# Patient Record
Sex: FEMALE | Race: OTHER | HISPANIC OR LATINO
[De-identification: names, ages, dates, MRNs, and addresses within clinical notes are randomized per-mention and may not be internally consistent; named-entity substitution may affect disease eponyms.]

---

## 2019-08-18 PROBLEM — Z00.00 ENCOUNTER FOR PREVENTIVE HEALTH EXAMINATION: Status: ACTIVE | Noted: 2019-08-18

## 2019-09-17 ENCOUNTER — APPOINTMENT (OUTPATIENT)
Dept: OPHTHALMOLOGY | Facility: CLINIC | Age: 59
End: 2019-09-17
Payer: COMMERCIAL

## 2019-09-17 ENCOUNTER — NON-APPOINTMENT (OUTPATIENT)
Age: 59
End: 2019-09-17

## 2019-09-17 PROCEDURE — 92002 INTRM OPH EXAM NEW PATIENT: CPT

## 2019-10-01 ENCOUNTER — APPOINTMENT (OUTPATIENT)
Dept: OPHTHALMOLOGY | Facility: CLINIC | Age: 59
End: 2019-10-01
Payer: COMMERCIAL

## 2019-10-01 ENCOUNTER — NON-APPOINTMENT (OUTPATIENT)
Age: 59
End: 2019-10-01

## 2019-10-01 PROCEDURE — 92012 INTRM OPH EXAM EST PATIENT: CPT

## 2019-10-10 ENCOUNTER — NON-APPOINTMENT (OUTPATIENT)
Age: 59
End: 2019-10-10

## 2019-10-10 ENCOUNTER — APPOINTMENT (OUTPATIENT)
Dept: OPHTHALMOLOGY | Facility: CLINIC | Age: 59
End: 2019-10-10
Payer: COMMERCIAL

## 2019-10-10 PROCEDURE — 92012 INTRM OPH EXAM EST PATIENT: CPT

## 2019-10-31 ENCOUNTER — NON-APPOINTMENT (OUTPATIENT)
Age: 59
End: 2019-10-31

## 2019-10-31 ENCOUNTER — APPOINTMENT (OUTPATIENT)
Dept: OPHTHALMOLOGY | Facility: CLINIC | Age: 59
End: 2019-10-31
Payer: COMMERCIAL

## 2019-10-31 PROCEDURE — 92012 INTRM OPH EXAM EST PATIENT: CPT

## 2019-11-22 ENCOUNTER — APPOINTMENT (OUTPATIENT)
Dept: OPHTHALMOLOGY | Facility: CLINIC | Age: 59
End: 2019-11-22
Payer: COMMERCIAL

## 2019-11-22 ENCOUNTER — NON-APPOINTMENT (OUTPATIENT)
Age: 59
End: 2019-11-22

## 2019-11-22 PROCEDURE — 92012 INTRM OPH EXAM EST PATIENT: CPT

## 2020-01-03 ENCOUNTER — APPOINTMENT (OUTPATIENT)
Dept: OPHTHALMOLOGY | Facility: CLINIC | Age: 60
End: 2020-01-03
Payer: COMMERCIAL

## 2020-01-03 ENCOUNTER — NON-APPOINTMENT (OUTPATIENT)
Age: 60
End: 2020-01-03

## 2020-01-03 PROCEDURE — 92012 INTRM OPH EXAM EST PATIENT: CPT

## 2020-03-06 ENCOUNTER — NON-APPOINTMENT (OUTPATIENT)
Age: 60
End: 2020-03-06

## 2020-03-06 ENCOUNTER — APPOINTMENT (OUTPATIENT)
Dept: OPHTHALMOLOGY | Facility: CLINIC | Age: 60
End: 2020-03-06
Payer: COMMERCIAL

## 2020-03-06 PROCEDURE — 92012 INTRM OPH EXAM EST PATIENT: CPT

## 2020-07-19 ENCOUNTER — TRANSCRIPTION ENCOUNTER (OUTPATIENT)
Age: 60
End: 2020-07-19

## 2020-07-20 ENCOUNTER — RESULT REVIEW (OUTPATIENT)
Age: 60
End: 2020-07-20

## 2020-07-20 ENCOUNTER — OUTPATIENT (OUTPATIENT)
Dept: OUTPATIENT SERVICES | Facility: HOSPITAL | Age: 60
LOS: 1 days | Discharge: HOME | End: 2020-07-20
Payer: COMMERCIAL

## 2020-07-20 ENCOUNTER — APPOINTMENT (OUTPATIENT)
Dept: OPHTHALMOLOGY | Facility: AMBULATORY SURGERY CENTER | Age: 60
End: 2020-07-20

## 2020-07-20 ENCOUNTER — NON-APPOINTMENT (OUTPATIENT)
Age: 60
End: 2020-07-20

## 2020-07-20 PROCEDURE — 65756 CORNEAL TRNSPL ENDOTHELIAL: CPT | Mod: 79,RT

## 2020-07-20 PROCEDURE — 88304 TISSUE EXAM BY PATHOLOGIST: CPT | Mod: 26

## 2020-07-21 ENCOUNTER — APPOINTMENT (OUTPATIENT)
Dept: OPHTHALMOLOGY | Facility: CLINIC | Age: 60
End: 2020-07-21
Payer: COMMERCIAL

## 2020-07-21 ENCOUNTER — NON-APPOINTMENT (OUTPATIENT)
Age: 60
End: 2020-07-21

## 2020-07-21 LAB
GRAM STN FLD: SIGNIFICANT CHANGE UP
SPECIMEN SOURCE: SIGNIFICANT CHANGE UP

## 2020-07-21 PROCEDURE — 99024 POSTOP FOLLOW-UP VISIT: CPT

## 2020-07-22 LAB — SURGICAL PATHOLOGY STUDY: SIGNIFICANT CHANGE UP

## 2020-07-24 LAB
CULTURE RESULTS: NO GROWTH — SIGNIFICANT CHANGE UP
SPECIMEN SOURCE: SIGNIFICANT CHANGE UP

## 2020-07-28 ENCOUNTER — NON-APPOINTMENT (OUTPATIENT)
Age: 60
End: 2020-07-28

## 2020-07-28 ENCOUNTER — APPOINTMENT (OUTPATIENT)
Dept: OPHTHALMOLOGY | Facility: CLINIC | Age: 60
End: 2020-07-28
Payer: COMMERCIAL

## 2020-07-28 PROCEDURE — 99024 POSTOP FOLLOW-UP VISIT: CPT

## 2020-08-26 ENCOUNTER — NON-APPOINTMENT (OUTPATIENT)
Age: 60
End: 2020-08-26

## 2020-08-26 ENCOUNTER — APPOINTMENT (OUTPATIENT)
Dept: OPHTHALMOLOGY | Facility: CLINIC | Age: 60
End: 2020-08-26
Payer: COMMERCIAL

## 2020-08-26 PROCEDURE — 99024 POSTOP FOLLOW-UP VISIT: CPT

## 2020-09-30 ENCOUNTER — NON-APPOINTMENT (OUTPATIENT)
Age: 60
End: 2020-09-30

## 2020-09-30 ENCOUNTER — APPOINTMENT (OUTPATIENT)
Dept: OPHTHALMOLOGY | Facility: CLINIC | Age: 60
End: 2020-09-30
Payer: COMMERCIAL

## 2020-09-30 PROCEDURE — 99024 POSTOP FOLLOW-UP VISIT: CPT

## 2020-11-04 ENCOUNTER — APPOINTMENT (OUTPATIENT)
Dept: OPHTHALMOLOGY | Facility: CLINIC | Age: 60
End: 2020-11-04
Payer: COMMERCIAL

## 2020-11-04 ENCOUNTER — NON-APPOINTMENT (OUTPATIENT)
Age: 60
End: 2020-11-04

## 2020-11-04 PROCEDURE — 99072 ADDL SUPL MATRL&STAF TM PHE: CPT

## 2020-11-04 PROCEDURE — 99212 OFFICE O/P EST SF 10 MIN: CPT

## 2020-12-16 ENCOUNTER — NON-APPOINTMENT (OUTPATIENT)
Age: 60
End: 2020-12-16

## 2020-12-16 ENCOUNTER — APPOINTMENT (OUTPATIENT)
Dept: OPHTHALMOLOGY | Facility: CLINIC | Age: 60
End: 2020-12-16
Payer: COMMERCIAL

## 2020-12-16 PROCEDURE — 99072 ADDL SUPL MATRL&STAF TM PHE: CPT

## 2020-12-16 PROCEDURE — 99212 OFFICE O/P EST SF 10 MIN: CPT

## 2021-01-27 ENCOUNTER — NON-APPOINTMENT (OUTPATIENT)
Age: 61
End: 2021-01-27

## 2021-01-27 ENCOUNTER — APPOINTMENT (OUTPATIENT)
Dept: OPHTHALMOLOGY | Facility: CLINIC | Age: 61
End: 2021-01-27
Payer: COMMERCIAL

## 2021-01-27 PROCEDURE — 99024 POSTOP FOLLOW-UP VISIT: CPT

## 2021-03-24 ENCOUNTER — APPOINTMENT (OUTPATIENT)
Dept: OPHTHALMOLOGY | Facility: CLINIC | Age: 61
End: 2021-03-24

## 2021-08-03 ENCOUNTER — APPOINTMENT (OUTPATIENT)
Dept: OPHTHALMOLOGY | Facility: CLINIC | Age: 61
End: 2021-08-03
Payer: COMMERCIAL

## 2021-08-03 ENCOUNTER — NON-APPOINTMENT (OUTPATIENT)
Age: 61
End: 2021-08-03

## 2021-08-03 PROCEDURE — 92012 INTRM OPH EXAM EST PATIENT: CPT

## 2021-11-03 ENCOUNTER — APPOINTMENT (OUTPATIENT)
Dept: OPHTHALMOLOGY | Facility: CLINIC | Age: 61
End: 2021-11-03
Payer: COMMERCIAL

## 2021-11-03 ENCOUNTER — NON-APPOINTMENT (OUTPATIENT)
Age: 61
End: 2021-11-03

## 2021-11-03 PROCEDURE — 92012 INTRM OPH EXAM EST PATIENT: CPT

## 2022-01-26 ENCOUNTER — NON-APPOINTMENT (OUTPATIENT)
Age: 62
End: 2022-01-26

## 2022-01-26 ENCOUNTER — APPOINTMENT (OUTPATIENT)
Dept: OPHTHALMOLOGY | Facility: CLINIC | Age: 62
End: 2022-01-26
Payer: COMMERCIAL

## 2022-01-26 PROCEDURE — 92012 INTRM OPH EXAM EST PATIENT: CPT

## 2022-05-06 RX ORDER — OFLOXACIN 0.3 %
1 DROPS OPHTHALMIC (EYE)
Refills: 0 | Status: COMPLETED | OUTPATIENT
Start: 2022-05-09 | End: 2022-05-09

## 2022-05-06 NOTE — ASU PATIENT PROFILE, ADULT - NSICDXPASTSURGICALHX_GEN_ALL_CORE_FT
PAST SURGICAL HISTORY:  H/O cataract extraction right eye    H/O  section     Transplanted cornea bilateral, multiple

## 2022-05-06 NOTE — ASU PATIENT PROFILE, ADULT - FALL HARM RISK - UNIVERSAL INTERVENTIONS
Bed in lowest position, wheels locked, appropriate side rails in place/Call bell, personal items and telephone in reach/Instruct patient to call for assistance before getting out of bed or chair/Non-slip footwear when patient is out of bed/[place] to call system/Physically safe environment - no spills, clutter or unnecessary equipment/Purposeful Proactive Rounding/Room/bathroom lighting operational, light cord in reach

## 2022-05-09 ENCOUNTER — TRANSCRIPTION ENCOUNTER (OUTPATIENT)
Age: 62
End: 2022-05-09

## 2022-05-09 ENCOUNTER — NON-APPOINTMENT (OUTPATIENT)
Age: 62
End: 2022-05-09

## 2022-05-09 ENCOUNTER — RESULT REVIEW (OUTPATIENT)
Age: 62
End: 2022-05-09

## 2022-05-09 ENCOUNTER — APPOINTMENT (OUTPATIENT)
Dept: OPHTHALMOLOGY | Facility: AMBULATORY SURGERY CENTER | Age: 62
End: 2022-05-09

## 2022-05-09 ENCOUNTER — OUTPATIENT (OUTPATIENT)
Dept: OUTPATIENT SERVICES | Facility: HOSPITAL | Age: 62
LOS: 1 days | Discharge: HOME | End: 2022-05-09
Payer: COMMERCIAL

## 2022-05-09 VITALS
TEMPERATURE: 98 F | SYSTOLIC BLOOD PRESSURE: 153 MMHG | HEART RATE: 68 BPM | HEIGHT: 62 IN | OXYGEN SATURATION: 100 % | RESPIRATION RATE: 16 BRPM | WEIGHT: 166.45 LBS | DIASTOLIC BLOOD PRESSURE: 70 MMHG

## 2022-05-09 VITALS
TEMPERATURE: 98 F | SYSTOLIC BLOOD PRESSURE: 137 MMHG | OXYGEN SATURATION: 100 % | HEART RATE: 62 BPM | RESPIRATION RATE: 16 BRPM | DIASTOLIC BLOOD PRESSURE: 72 MMHG

## 2022-05-09 DIAGNOSIS — Z98.49 CATARACT EXTRACTION STATUS, UNSPECIFIED EYE: Chronic | ICD-10-CM

## 2022-05-09 DIAGNOSIS — Z98.891 HISTORY OF UTERINE SCAR FROM PREVIOUS SURGERY: Chronic | ICD-10-CM

## 2022-05-09 DIAGNOSIS — Z94.7 CORNEAL TRANSPLANT STATUS: Chronic | ICD-10-CM

## 2022-05-09 LAB
GRAM STN FLD: SIGNIFICANT CHANGE UP
SPECIMEN SOURCE: SIGNIFICANT CHANGE UP

## 2022-05-09 PROCEDURE — 65756 CORNEAL TRNSPL ENDOTHELIAL: CPT | Mod: RT

## 2022-05-09 PROCEDURE — 88304 TISSUE EXAM BY PATHOLOGIST: CPT | Mod: 26

## 2022-05-09 RX ORDER — ACETAMINOPHEN 500 MG
650 TABLET ORAL ONCE
Refills: 0 | Status: COMPLETED | OUTPATIENT
Start: 2022-05-09 | End: 2022-05-09

## 2022-05-09 RX ADMIN — Medication 1 DROP(S): at 14:25

## 2022-05-09 RX ADMIN — Medication 1 DROP(S): at 14:20

## 2022-05-09 RX ADMIN — Medication 1 DROP(S): at 14:15

## 2022-05-09 RX ADMIN — Medication 650 MILLIGRAM(S): at 14:26

## 2022-05-09 NOTE — OPERATIVE REPORT - OPERATIVE RPOSRT DETAILS
PREOPERATIVE DIAGNOSIS: Failed DSAEK, Right Eye    POSTOPERATIVE DIAGNOSIS: Failed DSAEK, Right Eye    OPERATIVE PROCEDURE: Repeat Descemet Stripping Automated Endothelial Keratoplasty, Right Eye    IMPLANT: Donor # [],  Graft size []      PROCEDURE:  The patient was brought into the operating room and placed supine on the operating room table. After uneventful induction of neuroleptic anesthesia, a retrobulbar block consisting of 5-7 cc of  2% lidocaine and 0.75% marcaine was administered around the left eye. A modified van Lint style lid block was also given. The patient was then prepped and draped in the usual sterile fashion. A wire lid speculum was inserted into the operative eye giving a wide palpebral fissure. This procedure is going to be performed with an assistant surgeon whose extra hands are required to properly manage the surgery.    Using Castroviejo calipers it was determined that the chosen donor size would adequately cover the posterior corneal surface. A claus knife was used to perform appropriate paracenteses through clear cornea at the temporal and nasal oblique meridians. An anterior chamber maintainer was passed through the inferonasal paracentesis and balanced salt solution was infused to maintain the anterior chamber. Attention was then addressed to the precut donor tissue. The donor was cut to the appropriate size using a punch block and trephine, and the lenticule was covered with storage medium and set aside for later use.    Attention was then readdressed to the recipient cornea. A reverse Sinsky hook was used to score Descemet's membrane at a diameter just smaller than the intended donor lenticule. A keratome was used to create a clear corneal incision just inside the temporal limbus. A Descemet's stripping instrument was used to remove Descemet's membrane from the scored area. The keratome was then used to widen the temporal corneal wound to approximately 4.5 mm. The donor lenticule was then brought into the surgical field and loaded into a miniBusin glide  which was placed into the corneal wound. Tan endoforceps were then passed through a paracentesis across the anterior chamber to the temporal corneal wound grasping the donor lenticule and advancing it into the anterior chamber. The donor lenticule spontaneously unfurled endothelial side down.    The anterior chamber maintainer was removed and the paracentesis and corneal wound were closed with 10-0 nylon sutures. All the knots were then buried.  A 30 gauge cannula was placed on a 5 cc syringe filled with either air or 20% SF6 gas and then passed through a paracentesis under the donor lenticule filling the anterior chamber with air or gas to tamponade the lenticule against the inner surface of the cornea. Topical irrigation of 4 mg dexamethasone and 100 mg cephazolin was administered inferiorly in the conjunctival cul de sac. The lid speculum was removed from the eye and a shield and dressing placed over the eye.  The patient tolerated the procedure well and was to remain in a supine position in the recovery area for approximately 45 minutes before discharge     PREOPERATIVE DIAGNOSIS: Failed DSAEK, Right Eye    POSTOPERATIVE DIAGNOSIS: Failed DSAEK, Right Eye    OPERATIVE PROCEDURE: Repeat Descemet Stripping Automated Endothelial Keratoplasty, Right Eye    IMPLANT: Donor # [id number],  Graft size []      PROCEDURE:  The patient was brought into the operating room and placed supine on the operating room table. After uneventful induction of neuroleptic anesthesia, a retrobulbar block consisting of 5-7 cc of  2% lidocaine and 0.75% marcaine was administered around the left eye. A modified van Lint style lid block was also given. The patient was then prepped and draped in the usual sterile fashion. A wire lid speculum was inserted into the operative eye giving a wide palpebral fissure. This procedure is going to be performed with an assistant surgeon whose extra hands are required to properly manage the surgery.    Using Castroviejo calipers it was determined that the chosen donor size would adequately cover the posterior corneal surface. A claus knife was used to perform appropriate paracenteses through clear cornea at the temporal and nasal oblique meridians. An anterior chamber maintainer was passed through the inferonasal paracentesis and balanced salt solution was infused to maintain the anterior chamber. Attention was then addressed to the precut donor tissue. The donor was cut to the appropriate size using a punch block and trephine, and the lenticule was covered with storage medium and set aside for later use.    Attention was then readdressed to the recipient cornea. A reverse Sinsky hook was used to score Descemet's membrane at a diameter just smaller than the intended donor lenticule. A keratome was used to create a clear corneal incision just inside the temporal limbus. A Descemet's stripping instrument was used to remove Descemet's membrane from the scored area. The keratome was then used to widen the temporal corneal wound to approximately 4.5 mm. The donor lenticule was then brought into the surgical field and loaded into a miniBusin glide  which was placed into the corneal wound. Tan endoforceps were then passed through a paracentesis across the anterior chamber to the temporal corneal wound grasping the donor lenticule and advancing it into the anterior chamber. The donor lenticule spontaneously unfurled endothelial side down.    The anterior chamber maintainer was removed and the paracentesis and corneal wound were closed with 10-0 nylon sutures. All the knots were then buried.  A 30 gauge cannula was placed on a 5 cc syringe filled with either air or 20% SF6 gas and then passed through a paracentesis under the donor lenticule filling the anterior chamber with air or gas to tamponade the lenticule against the inner surface of the cornea. Topical irrigation of 4 mg dexamethasone and 100 mg cephazolin was administered inferiorly in the conjunctival cul de sac. The lid speculum was removed from the eye and a shield and dressing placed over the eye.  The patient tolerated the procedure well and was to remain in a supine position in the recovery area for approximately 45 minutes before discharge     PREOPERATIVE DIAGNOSIS: Failed DSAEK, Right Eye    POSTOPERATIVE DIAGNOSIS: Failed DSAEK, Right Eye    OPERATIVE PROCEDURE: Repeat Descemet Stripping Automated Endothelial Keratoplasty and Pupilloplasty, Right Eye    IMPLANT: Donor # [id number],  Graft size 8.0mm      PROCEDURE:  The patient was brought into the operating room and placed supine on the operating room table. After uneventful induction of neuroleptic anesthesia, a retrobulbar block consisting of 5-7 cc of  2% lidocaine and 0.75% marcaine was administered around the left eye. A modified van Lint style lid block was also given. The patient was then prepped and draped in the usual sterile fashion. A wire lid speculum was inserted into the operative eye giving a wide palpebral fissure. This procedure is going to be performed with an assistant surgeon whose extra hands are required to properly manage the surgery.    Using Castroviejo calipers it was determined that the chosen donor size would adequately cover the posterior corneal surface. A claus knife was used to perform appropriate paracenteses through clear cornea at the temporal and nasal oblique meridians. A cyclodialysis spatula was used to perform synechialysis of the iris adhesions to the inferior cornea. An anterior chamber maintainer was passed through the inferonasal paracentesis and balanced salt solution was infused to maintain the anterior chamber. A reverse sinsky hook was used to detach the failed DSAEK lenticule. Attention was then addressed to the precut donor tissue. The donor was cut to the appropriate size using a punch block and trephine, and the lenticule was covered with storage medium and set aside for later use.    Attention was then readdressed to the recipient cornea. A keratome was used to create a clear corneal incision just inside the temporal limbus. A reverse sinsky was used to remove the failed DSAEK lenticule through the wound.  Given the laxity of the pupil by the temporal wound the decision was made to perform a pupilloplasty. A  10-0 prolene suture on a CTC 6L needle was placed through the 10 o clock paracentesis into the iris at the superotemporal edege and exited through the iris on the inferotermporal side. The needle was the placed through the peripheral cornea on the other side of the haptic and externalized.  The two free ends of the prolene suture were captured through the previously placed wound, knotted, and placed back into the eye thus completing the pupilloplasty. The keratome was then used to widen the temporal corneal wound to approximately 4.5 mm. The donor lenticule was then brought into the surgical field and loaded into a miniBusin glide  which was placed into the corneal wound. Tan endoforceps were then passed through a paracentesis across the anterior chamber to the temporal corneal wound grasping the donor lenticule and advancing it into the anterior chamber. The donor lenticule spontaneously unfurled endothelial side down.    The anterior chamber maintainer was removed and the paracentesis and corneal wound were closed with 10-0 nylon sutures. All the knots were then buried.  A 30 gauge cannula was placed on a 5 cc syringe filled with air and then passed through a paracentesis under the donor lenticule filling the anterior chamber with air or gas to tamponade the lenticule against the inner surface of the cornea. Topical irrigation of 4 mg dexamethasone and 100 mg cephazolin was administered inferiorly in the conjunctival cul de sac. The lid speculum was removed from the eye and a shield and dressing placed over the eye.  The patient tolerated the procedure well and was to remain in a supine position in the recovery area for approximately 45 minutes before discharge.

## 2022-05-09 NOTE — ASU DISCHARGE PLAN (ADULT/PEDIATRIC) - NS MD DC FALL RISK RISK
For information on Fall & Injury Prevention, visit: [email or web address] OR  [email or web address] OR  https://www.cdc.gov/steadi/patient.html

## 2022-05-10 ENCOUNTER — APPOINTMENT (OUTPATIENT)
Dept: OPHTHALMOLOGY | Facility: CLINIC | Age: 62
End: 2022-05-10
Payer: COMMERCIAL

## 2022-05-10 ENCOUNTER — NON-APPOINTMENT (OUTPATIENT)
Age: 62
End: 2022-05-10

## 2022-05-10 PROCEDURE — 99024 POSTOP FOLLOW-UP VISIT: CPT

## 2022-05-17 ENCOUNTER — NON-APPOINTMENT (OUTPATIENT)
Age: 62
End: 2022-05-17

## 2022-05-17 ENCOUNTER — APPOINTMENT (OUTPATIENT)
Dept: OPHTHALMOLOGY | Facility: CLINIC | Age: 62
End: 2022-05-17
Payer: COMMERCIAL

## 2022-05-17 PROBLEM — I10 ESSENTIAL (PRIMARY) HYPERTENSION: Chronic | Status: ACTIVE | Noted: 2022-05-07

## 2022-05-17 PROBLEM — E78.00 PURE HYPERCHOLESTEROLEMIA, UNSPECIFIED: Chronic | Status: ACTIVE | Noted: 2022-05-07

## 2022-05-17 PROCEDURE — 99024 POSTOP FOLLOW-UP VISIT: CPT

## 2022-05-31 LAB
CULTURE RESULTS: NO GROWTH — SIGNIFICANT CHANGE UP
SPECIMEN SOURCE: SIGNIFICANT CHANGE UP

## 2022-06-07 ENCOUNTER — NON-APPOINTMENT (OUTPATIENT)
Age: 62
End: 2022-06-07

## 2022-06-07 ENCOUNTER — APPOINTMENT (OUTPATIENT)
Dept: OPHTHALMOLOGY | Facility: CLINIC | Age: 62
End: 2022-06-07
Payer: COMMERCIAL

## 2022-06-07 PROCEDURE — 99024 POSTOP FOLLOW-UP VISIT: CPT

## 2022-06-21 ENCOUNTER — APPOINTMENT (OUTPATIENT)
Dept: OPHTHALMOLOGY | Facility: CLINIC | Age: 62
End: 2022-06-21
Payer: COMMERCIAL

## 2022-06-21 ENCOUNTER — NON-APPOINTMENT (OUTPATIENT)
Age: 62
End: 2022-06-21

## 2022-06-21 PROCEDURE — 99024 POSTOP FOLLOW-UP VISIT: CPT

## 2022-08-17 ENCOUNTER — NON-APPOINTMENT (OUTPATIENT)
Age: 62
End: 2022-08-17

## 2022-08-17 ENCOUNTER — APPOINTMENT (OUTPATIENT)
Dept: OPHTHALMOLOGY | Facility: CLINIC | Age: 62
End: 2022-08-17

## 2022-08-17 PROCEDURE — 99212 OFFICE O/P EST SF 10 MIN: CPT

## 2022-11-21 NOTE — ASU PATIENT PROFILE, ADULT - WILL THE PATIENT ACCEPT THE PFIZER COVID-19 VACCINE IF ELIGIBLE AND IT IS AVAILABLE?
Not applicable Opioid Counseling: I discussed with the patient the potential side effects of opioids including but not limited to addiction, altered mental status, and depression. I stressed avoiding alcohol, benzodiazepines, muscle relaxants and sleep aids unless specifically okayed by a physician. The patient verbalized understanding of the proper use and possible adverse effects of opioids. All of the patient's questions and concerns were addressed. They were instructed to flush the remaining pills down the toilet if they did not need them for pain.

## 2023-01-13 ENCOUNTER — NON-APPOINTMENT (OUTPATIENT)
Age: 63
End: 2023-01-13

## 2023-01-13 ENCOUNTER — APPOINTMENT (OUTPATIENT)
Dept: OPHTHALMOLOGY | Facility: CLINIC | Age: 63
End: 2023-01-13
Payer: COMMERCIAL

## 2023-01-13 PROCEDURE — 92012 INTRM OPH EXAM EST PATIENT: CPT

## 2023-05-19 ENCOUNTER — NON-APPOINTMENT (OUTPATIENT)
Age: 63
End: 2023-05-19

## 2023-05-19 ENCOUNTER — APPOINTMENT (OUTPATIENT)
Dept: OPHTHALMOLOGY | Facility: CLINIC | Age: 63
End: 2023-05-19
Payer: COMMERCIAL

## 2023-05-19 PROCEDURE — 92012 INTRM OPH EXAM EST PATIENT: CPT

## 2023-08-18 ENCOUNTER — NON-APPOINTMENT (OUTPATIENT)
Age: 63
End: 2023-08-18

## 2023-08-18 ENCOUNTER — APPOINTMENT (OUTPATIENT)
Dept: OPHTHALMOLOGY | Facility: CLINIC | Age: 63
End: 2023-08-18
Payer: COMMERCIAL

## 2023-08-18 PROCEDURE — 92012 INTRM OPH EXAM EST PATIENT: CPT

## 2023-09-26 ENCOUNTER — NON-APPOINTMENT (OUTPATIENT)
Age: 63
End: 2023-09-26

## 2023-09-26 ENCOUNTER — APPOINTMENT (OUTPATIENT)
Dept: OPHTHALMOLOGY | Facility: CLINIC | Age: 63
End: 2023-09-26
Payer: COMMERCIAL

## 2023-09-26 PROCEDURE — 92014 COMPRE OPH EXAM EST PT 1/>: CPT

## 2023-09-26 PROCEDURE — 76514 ECHO EXAM OF EYE THICKNESS: CPT

## 2023-09-26 PROCEDURE — 92133 CPTRZD OPH DX IMG PST SGM ON: CPT

## 2023-10-27 ENCOUNTER — APPOINTMENT (OUTPATIENT)
Dept: OPHTHALMOLOGY | Facility: CLINIC | Age: 63
End: 2023-10-27

## 2023-11-10 ENCOUNTER — NON-APPOINTMENT (OUTPATIENT)
Age: 63
End: 2023-11-10

## 2023-11-10 ENCOUNTER — APPOINTMENT (OUTPATIENT)
Dept: OPHTHALMOLOGY | Facility: CLINIC | Age: 63
End: 2023-11-10
Payer: COMMERCIAL

## 2023-11-10 PROCEDURE — 92012 INTRM OPH EXAM EST PATIENT: CPT

## 2024-02-09 ENCOUNTER — APPOINTMENT (OUTPATIENT)
Dept: OPHTHALMOLOGY | Facility: CLINIC | Age: 64
End: 2024-02-09
Payer: COMMERCIAL

## 2024-02-09 ENCOUNTER — NON-APPOINTMENT (OUTPATIENT)
Age: 64
End: 2024-02-09

## 2024-02-09 PROCEDURE — 92012 INTRM OPH EXAM EST PATIENT: CPT

## 2024-05-30 ENCOUNTER — NON-APPOINTMENT (OUTPATIENT)
Age: 64
End: 2024-05-30

## 2024-05-30 ENCOUNTER — APPOINTMENT (OUTPATIENT)
Dept: OPHTHALMOLOGY | Facility: CLINIC | Age: 64
End: 2024-05-30
Payer: COMMERCIAL

## 2024-05-30 PROCEDURE — 92002 INTRM OPH EXAM NEW PATIENT: CPT

## 2024-06-12 ENCOUNTER — NON-APPOINTMENT (OUTPATIENT)
Age: 64
End: 2024-06-12

## 2024-06-12 ENCOUNTER — APPOINTMENT (OUTPATIENT)
Dept: OPHTHALMOLOGY | Facility: CLINIC | Age: 64
End: 2024-06-12
Payer: COMMERCIAL

## 2024-06-12 PROCEDURE — 92012 INTRM OPH EXAM EST PATIENT: CPT

## 2024-07-02 ENCOUNTER — APPOINTMENT (OUTPATIENT)
Dept: OPHTHALMOLOGY | Facility: AMBULATORY SURGERY CENTER | Age: 64
End: 2024-07-02

## 2024-07-02 ENCOUNTER — OUTPATIENT (OUTPATIENT)
Dept: OUTPATIENT SERVICES | Facility: HOSPITAL | Age: 64
LOS: 1 days | Discharge: HOME | End: 2024-07-02
Payer: COMMERCIAL

## 2024-07-02 ENCOUNTER — RESULT REVIEW (OUTPATIENT)
Age: 64
End: 2024-07-02

## 2024-07-02 VITALS
HEART RATE: 62 BPM | TEMPERATURE: 97 F | DIASTOLIC BLOOD PRESSURE: 58 MMHG | OXYGEN SATURATION: 100 % | SYSTOLIC BLOOD PRESSURE: 126 MMHG | RESPIRATION RATE: 16 BRPM

## 2024-07-02 VITALS
HEART RATE: 69 BPM | RESPIRATION RATE: 16 BRPM | HEIGHT: 62 IN | TEMPERATURE: 97 F | OXYGEN SATURATION: 100 % | DIASTOLIC BLOOD PRESSURE: 62 MMHG | SYSTOLIC BLOOD PRESSURE: 132 MMHG | WEIGHT: 171.74 LBS

## 2024-07-02 DIAGNOSIS — Z98.891 HISTORY OF UTERINE SCAR FROM PREVIOUS SURGERY: Chronic | ICD-10-CM

## 2024-07-02 DIAGNOSIS — Z98.49 CATARACT EXTRACTION STATUS, UNSPECIFIED EYE: Chronic | ICD-10-CM

## 2024-07-02 DIAGNOSIS — Z98.51 TUBAL LIGATION STATUS: Chronic | ICD-10-CM

## 2024-07-02 DIAGNOSIS — Z94.7 CORNEAL TRANSPLANT STATUS: Chronic | ICD-10-CM

## 2024-07-02 PROCEDURE — 65756 CORNEAL TRNSPL ENDOTHELIAL: CPT | Mod: RT

## 2024-07-02 PROCEDURE — 88304 TISSUE EXAM BY PATHOLOGIST: CPT | Mod: 26

## 2024-07-02 DEVICE — GAS IOL HEXAFLUORIDE CYL
Type: IMPLANTABLE DEVICE | Site: RIGHT | Status: NON-FUNCTIONAL
Removed: 2024-07-02

## 2024-07-02 RX ORDER — OMEPRAZOLE 10 MG/1
1 CAPSULE, DELAYED RELEASE ORAL
Refills: 0 | DISCHARGE

## 2024-07-02 RX ORDER — HYDROCHLOROTHIAZIDE 25 MG
1 TABLET ORAL
Refills: 0 | DISCHARGE

## 2024-07-02 RX ORDER — OFLOXACIN 3 MG/ML
1 SOLUTION/ DROPS OPHTHALMIC
Refills: 0 | Status: COMPLETED | OUTPATIENT
Start: 2024-07-02 | End: 2024-07-02

## 2024-07-02 RX ORDER — LOSARTAN POTASSIUM 100 MG/1
1 TABLET, FILM COATED ORAL
Qty: 0 | Refills: 0 | DISCHARGE

## 2024-07-02 RX ORDER — OXYCODONE HYDROCHLORIDE 100 MG/5ML
5 SOLUTION ORAL ONCE
Refills: 0 | Status: DISCONTINUED | OUTPATIENT
Start: 2024-07-02 | End: 2024-07-02

## 2024-07-02 RX ORDER — BRIMONIDINE TARTRATE, TIMOLOL MALEATE 2; 5 MG/ML; MG/ML
1 SOLUTION/ DROPS OPHTHALMIC
Qty: 0 | Refills: 0 | DISCHARGE

## 2024-07-02 RX ORDER — MONTELUKAST SODIUM 10 MG/1
1 TABLET, FILM COATED ORAL
Refills: 0 | DISCHARGE

## 2024-07-02 RX ORDER — METOPROLOL TARTRATE 50 MG
1 TABLET ORAL
Refills: 0 | DISCHARGE

## 2024-07-02 RX ORDER — DUREZOL 0.5 MG/ML
0 EMULSION OPHTHALMIC
Qty: 0 | Refills: 0 | DISCHARGE

## 2024-07-02 RX ORDER — DEXTROSE MONOHYDRATE AND SODIUM CHLORIDE 5; .3 G/100ML; G/100ML
1000 INJECTION, SOLUTION INTRAVENOUS
Refills: 0 | Status: DISCONTINUED | OUTPATIENT
Start: 2024-07-02 | End: 2024-07-02

## 2024-07-02 RX ORDER — ATORVASTATIN CALCIUM 80 MG/1
1 TABLET, FILM COATED ORAL
Qty: 0 | Refills: 0 | DISCHARGE

## 2024-07-02 RX ORDER — ONDANSETRON HYDROCHLORIDE 2 MG/ML
4 INJECTION INTRAMUSCULAR; INTRAVENOUS ONCE
Refills: 0 | Status: DISCONTINUED | OUTPATIENT
Start: 2024-07-02 | End: 2024-07-02

## 2024-07-02 RX ORDER — TETRACAINE HCL 0.5 %
1 DROPS OPHTHALMIC (EYE) ONCE
Refills: 0 | Status: COMPLETED | OUTPATIENT
Start: 2024-07-02 | End: 2024-07-02

## 2024-07-02 RX ORDER — ACETAMINOPHEN 325 MG
650 TABLET ORAL ONCE
Refills: 0 | Status: DISCONTINUED | OUTPATIENT
Start: 2024-07-02 | End: 2024-07-02

## 2024-07-02 RX ORDER — BUDESONIDE/FORMOTEROL FUMARATE 160-4.5MCG
2 HFA AEROSOL WITH ADAPTER (GRAM) INHALATION
Refills: 0 | DISCHARGE

## 2024-07-02 RX ADMIN — OFLOXACIN 1 DROP(S): 3 SOLUTION/ DROPS OPHTHALMIC at 13:55

## 2024-07-02 RX ADMIN — OFLOXACIN 1 DROP(S): 3 SOLUTION/ DROPS OPHTHALMIC at 13:45

## 2024-07-02 RX ADMIN — Medication 1 DROP(S): at 13:45

## 2024-07-02 RX ADMIN — OFLOXACIN 1 DROP(S): 3 SOLUTION/ DROPS OPHTHALMIC at 13:50

## 2024-07-03 ENCOUNTER — APPOINTMENT (OUTPATIENT)
Dept: OPHTHALMOLOGY | Facility: CLINIC | Age: 64
End: 2024-07-03
Payer: COMMERCIAL

## 2024-07-03 ENCOUNTER — NON-APPOINTMENT (OUTPATIENT)
Age: 64
End: 2024-07-03

## 2024-07-03 PROCEDURE — 99024 POSTOP FOLLOW-UP VISIT: CPT

## 2024-07-08 LAB — SURGICAL PATHOLOGY STUDY: SIGNIFICANT CHANGE UP

## 2024-07-10 ENCOUNTER — NON-APPOINTMENT (OUTPATIENT)
Age: 64
End: 2024-07-10

## 2024-07-10 ENCOUNTER — APPOINTMENT (OUTPATIENT)
Dept: OPHTHALMOLOGY | Facility: CLINIC | Age: 64
End: 2024-07-10
Payer: COMMERCIAL

## 2024-07-10 PROCEDURE — 92134 CPTRZ OPH DX IMG PST SGM RTA: CPT

## 2024-07-10 PROCEDURE — 99024 POSTOP FOLLOW-UP VISIT: CPT

## 2024-07-16 LAB
CULTURE RESULTS: SIGNIFICANT CHANGE UP
SPECIMEN SOURCE: SIGNIFICANT CHANGE UP

## 2024-07-17 ENCOUNTER — NON-APPOINTMENT (OUTPATIENT)
Age: 64
End: 2024-07-17

## 2024-07-17 ENCOUNTER — APPOINTMENT (OUTPATIENT)
Dept: OPHTHALMOLOGY | Facility: CLINIC | Age: 64
End: 2024-07-17
Payer: COMMERCIAL

## 2024-07-17 PROCEDURE — 99024 POSTOP FOLLOW-UP VISIT: CPT

## 2024-07-31 ENCOUNTER — APPOINTMENT (OUTPATIENT)
Dept: OPHTHALMOLOGY | Facility: CLINIC | Age: 64
End: 2024-07-31
Payer: COMMERCIAL

## 2024-07-31 ENCOUNTER — NON-APPOINTMENT (OUTPATIENT)
Age: 64
End: 2024-07-31

## 2024-07-31 PROCEDURE — 99024 POSTOP FOLLOW-UP VISIT: CPT

## 2024-08-27 ENCOUNTER — NON-APPOINTMENT (OUTPATIENT)
Age: 64
End: 2024-08-27

## 2024-08-27 ENCOUNTER — APPOINTMENT (OUTPATIENT)
Dept: OPHTHALMOLOGY | Facility: CLINIC | Age: 64
End: 2024-08-27
Payer: COMMERCIAL

## 2024-08-27 PROCEDURE — 99024 POSTOP FOLLOW-UP VISIT: CPT

## 2024-09-24 ENCOUNTER — APPOINTMENT (OUTPATIENT)
Dept: OPHTHALMOLOGY | Facility: CLINIC | Age: 64
End: 2024-09-24
Payer: COMMERCIAL

## 2024-09-24 ENCOUNTER — NON-APPOINTMENT (OUTPATIENT)
Age: 64
End: 2024-09-24

## 2024-09-24 PROCEDURE — 99024 POSTOP FOLLOW-UP VISIT: CPT

## 2024-11-05 ENCOUNTER — NON-APPOINTMENT (OUTPATIENT)
Age: 64
End: 2024-11-05

## 2024-11-05 ENCOUNTER — APPOINTMENT (OUTPATIENT)
Dept: OPHTHALMOLOGY | Facility: CLINIC | Age: 64
End: 2024-11-05
Payer: COMMERCIAL

## 2024-11-05 PROCEDURE — 92012 INTRM OPH EXAM EST PATIENT: CPT

## 2024-11-20 ENCOUNTER — NON-APPOINTMENT (OUTPATIENT)
Age: 64
End: 2024-11-20

## 2024-11-20 ENCOUNTER — TRANSCRIPTION ENCOUNTER (OUTPATIENT)
Age: 64
End: 2024-11-20

## 2024-11-20 ENCOUNTER — APPOINTMENT (OUTPATIENT)
Dept: OPHTHALMOLOGY | Facility: CLINIC | Age: 64
End: 2024-11-20
Payer: COMMERCIAL

## 2024-11-20 PROCEDURE — 92012 INTRM OPH EXAM EST PATIENT: CPT

## 2024-11-21 ENCOUNTER — NON-APPOINTMENT (OUTPATIENT)
Age: 64
End: 2024-11-21

## 2024-11-21 ENCOUNTER — APPOINTMENT (OUTPATIENT)
Dept: OPHTHALMOLOGY | Facility: CLINIC | Age: 64
End: 2024-11-21
Payer: COMMERCIAL

## 2024-11-21 PROCEDURE — 92133 CPTRZD OPH DX IMG PST SGM ON: CPT

## 2024-11-21 PROCEDURE — 92012 INTRM OPH EXAM EST PATIENT: CPT

## 2024-11-25 ENCOUNTER — APPOINTMENT (OUTPATIENT)
Dept: OPHTHALMOLOGY | Facility: CLINIC | Age: 64
End: 2024-11-25
Payer: COMMERCIAL

## 2024-11-25 ENCOUNTER — NON-APPOINTMENT (OUTPATIENT)
Age: 64
End: 2024-11-25

## 2024-11-25 PROCEDURE — 92012 INTRM OPH EXAM EST PATIENT: CPT

## 2024-12-23 ENCOUNTER — APPOINTMENT (OUTPATIENT)
Dept: OPHTHALMOLOGY | Facility: CLINIC | Age: 64
End: 2024-12-23
Payer: COMMERCIAL

## 2024-12-23 ENCOUNTER — NON-APPOINTMENT (OUTPATIENT)
Age: 64
End: 2024-12-23

## 2024-12-23 PROCEDURE — 92083 EXTENDED VISUAL FIELD XM: CPT

## 2024-12-23 PROCEDURE — 92012 INTRM OPH EXAM EST PATIENT: CPT

## 2025-01-23 ENCOUNTER — APPOINTMENT (OUTPATIENT)
Dept: OPHTHALMOLOGY | Facility: CLINIC | Age: 65
End: 2025-01-23
Payer: COMMERCIAL

## 2025-01-23 ENCOUNTER — NON-APPOINTMENT (OUTPATIENT)
Age: 65
End: 2025-01-23

## 2025-01-23 PROCEDURE — 92012 INTRM OPH EXAM EST PATIENT: CPT

## 2025-02-12 ENCOUNTER — APPOINTMENT (OUTPATIENT)
Dept: OPHTHALMOLOGY | Facility: CLINIC | Age: 65
End: 2025-02-12
Payer: COMMERCIAL

## 2025-02-12 ENCOUNTER — NON-APPOINTMENT (OUTPATIENT)
Age: 65
End: 2025-02-12

## 2025-02-12 PROCEDURE — 92012 INTRM OPH EXAM EST PATIENT: CPT

## 2025-03-06 ENCOUNTER — APPOINTMENT (OUTPATIENT)
Dept: OPHTHALMOLOGY | Facility: CLINIC | Age: 65
End: 2025-03-06
Payer: COMMERCIAL

## 2025-03-06 ENCOUNTER — NON-APPOINTMENT (OUTPATIENT)
Age: 65
End: 2025-03-06

## 2025-03-06 PROCEDURE — 92012 INTRM OPH EXAM EST PATIENT: CPT

## 2025-03-06 PROCEDURE — 92133 CPTRZD OPH DX IMG PST SGM ON: CPT

## 2025-04-10 ENCOUNTER — APPOINTMENT (OUTPATIENT)
Dept: OPHTHALMOLOGY | Facility: CLINIC | Age: 65
End: 2025-04-10

## 2025-04-23 ENCOUNTER — APPOINTMENT (OUTPATIENT)
Dept: OPHTHALMOLOGY | Facility: CLINIC | Age: 65
End: 2025-04-23
Payer: COMMERCIAL

## 2025-04-23 ENCOUNTER — NON-APPOINTMENT (OUTPATIENT)
Age: 65
End: 2025-04-23

## 2025-04-23 PROCEDURE — 92012 INTRM OPH EXAM EST PATIENT: CPT

## 2025-06-05 ENCOUNTER — NON-APPOINTMENT (OUTPATIENT)
Age: 65
End: 2025-06-05

## 2025-06-05 ENCOUNTER — APPOINTMENT (OUTPATIENT)
Dept: OPHTHALMOLOGY | Facility: CLINIC | Age: 65
End: 2025-06-05
Payer: COMMERCIAL

## 2025-06-05 PROCEDURE — 92014 COMPRE OPH EXAM EST PT 1/>: CPT

## 2025-06-05 PROCEDURE — 92133 CPTRZD OPH DX IMG PST SGM ON: CPT

## 2025-06-18 ENCOUNTER — NON-APPOINTMENT (OUTPATIENT)
Age: 65
End: 2025-06-18

## 2025-06-18 ENCOUNTER — APPOINTMENT (OUTPATIENT)
Dept: OPHTHALMOLOGY | Facility: CLINIC | Age: 65
End: 2025-06-18
Payer: COMMERCIAL

## 2025-06-18 PROCEDURE — 92250 FUNDUS PHOTOGRAPHY W/I&R: CPT

## 2025-06-18 PROCEDURE — 92012 INTRM OPH EXAM EST PATIENT: CPT

## 2025-09-03 ENCOUNTER — NON-APPOINTMENT (OUTPATIENT)
Age: 65
End: 2025-09-03

## 2025-09-03 ENCOUNTER — APPOINTMENT (OUTPATIENT)
Dept: OPHTHALMOLOGY | Facility: CLINIC | Age: 65
End: 2025-09-03
Payer: COMMERCIAL

## 2025-09-03 PROCEDURE — 92012 INTRM OPH EXAM EST PATIENT: CPT

## 2025-09-11 ENCOUNTER — APPOINTMENT (OUTPATIENT)
Dept: OPHTHALMOLOGY | Facility: CLINIC | Age: 65
End: 2025-09-11
Payer: COMMERCIAL

## 2025-09-11 ENCOUNTER — NON-APPOINTMENT (OUTPATIENT)
Age: 65
End: 2025-09-11

## 2025-09-11 PROCEDURE — 92083 EXTENDED VISUAL FIELD XM: CPT

## 2025-09-11 PROCEDURE — 92133 CPTRZD OPH DX IMG PST SGM ON: CPT

## 2025-09-11 PROCEDURE — 92012 INTRM OPH EXAM EST PATIENT: CPT

## (undated) DEVICE — STOPCOCK 4-WAY

## (undated) DEVICE — FILTER SYR 22 MICRONS

## (undated) DEVICE — SYR LUER LOK 3CC

## (undated) DEVICE — DRAPE MEDIUM SHEET 44" X 70"

## (undated) DEVICE — GLV 7.5 PROTEXIS (WHITE)

## (undated) DEVICE — PUNCH TREPH DISP STRL 8MM

## (undated) DEVICE — MARKING PEN DEVON DUAL TIP W RULER

## (undated) DEVICE — PACK ANTERIOR SEGMENT

## (undated) DEVICE — ACM SELF RETAINING 20G

## (undated) DEVICE — SOL IRR BAL SALT 500ML

## (undated) DEVICE — WARMING BLANKET LOWER ADULT

## (undated) DEVICE — SPEAR CELLULOSE 40410

## (undated) DEVICE — SUT PROLENE 10-0 4" CTC-6L

## (undated) DEVICE — PREP BETADINE 5% STERILE OPTHALMIC SOLUTION

## (undated) DEVICE — NDL RETROBULBAR VISITEC 25X1.5

## (undated) DEVICE — DRSG TAPE TRANSPORE 1"

## (undated) DEVICE — KNIFE ALCON STANDARD FULL HANDLE 15 DEG (PINK)

## (undated) DEVICE — KNIFE FULL HANDLE ANGLE 2.75MM

## (undated) DEVICE — CENTURION PAK FACO ACTIVE INTREPID FMS 0.9 MM ULTRA

## (undated) DEVICE — GOWN ROYAL SILK XL

## (undated) DEVICE — NDL HYPO NONSAFE 30G X 0.5" (BEIGE)

## (undated) DEVICE — NDL HYPO REGULAR BEVEL 30G X .5"

## (undated) DEVICE — CANNULA IRR AC CHAMBER 8MM BEND

## (undated) DEVICE — PETRI DISH MED 3.5"

## (undated) DEVICE — TUBING ALARIS PUMP MODULE NON-DEHP

## (undated) DEVICE — SYR LUER LOK 5CC

## (undated) DEVICE — NDL HYPO SAFE 18G X 1.5" (PINK)

## (undated) DEVICE — DRAPE MICROSCOPE KNOB COVER SMALL (2 PCS)

## (undated) DEVICE — CULTURESWAB WITHOUT CHARCOAL

## (undated) DEVICE — VENODYNE/SCD SLEEVE CALF MEDIUM

## (undated) DEVICE — NDL HYPO SAFE 25G X 5/8" (ORANGE)

## (undated) DEVICE — SUT NYLON 10-0 12" CU-5

## (undated) DEVICE — SYR LUER LOK 10CC

## (undated) DEVICE — Device